# Patient Record
Sex: MALE | Race: WHITE | URBAN - METROPOLITAN AREA
[De-identification: names, ages, dates, MRNs, and addresses within clinical notes are randomized per-mention and may not be internally consistent; named-entity substitution may affect disease eponyms.]

---

## 2018-04-06 ENCOUNTER — TRANSFERRED RECORDS (OUTPATIENT)
Dept: HEALTH INFORMATION MANAGEMENT | Facility: CLINIC | Age: 58
End: 2018-04-06

## 2018-04-06 ENCOUNTER — MEDICAL CORRESPONDENCE (OUTPATIENT)
Dept: HEALTH INFORMATION MANAGEMENT | Facility: CLINIC | Age: 58
End: 2018-04-06

## 2018-04-16 DIAGNOSIS — Z79.899 HIGH RISK MEDICATION USE: Primary | ICD-10-CM

## 2018-04-17 ENCOUNTER — ALLIED HEALTH/NURSE VISIT (OUTPATIENT)
Dept: OPHTHALMOLOGY | Facility: CLINIC | Age: 58
End: 2018-04-17
Attending: OPHTHALMOLOGY
Payer: COMMERCIAL

## 2018-04-17 DIAGNOSIS — Z79.899 HIGH RISK MEDICATION USE: ICD-10-CM

## 2018-04-17 PROCEDURE — 92275 MFERG OU (BOTH EYES): CPT | Mod: ZF

## 2018-04-17 PROCEDURE — 40000269 ZZH STATISTIC NO CHARGE FACILITY FEE: Mod: ZF

## 2018-04-17 RX ORDER — HYDROXYCHLOROQUINE SULFATE 200 MG/1
TABLET, FILM COATED ORAL
COMMUNITY
Start: 2018-03-26

## 2018-04-17 RX ORDER — FOLIC ACID 1 MG/1
1 TABLET ORAL
COMMUNITY
Start: 2017-07-11

## 2018-04-17 RX ORDER — SIMVASTATIN 20 MG
20 TABLET ORAL
COMMUNITY
Start: 2017-04-10

## 2018-04-17 RX ORDER — LOSARTAN POTASSIUM AND HYDROCHLOROTHIAZIDE 12.5; 5 MG/1; MG/1
TABLET ORAL
COMMUNITY
Start: 2017-04-10

## 2018-04-17 RX ORDER — FLUOXETINE 10 MG/1
10 CAPSULE ORAL
COMMUNITY
Start: 2017-07-11

## 2018-04-17 ASSESSMENT — VISUAL ACUITY
OD_SC: 20/20
METHOD: SNELLEN - LINEAR
OS_SC: 20/20

## 2018-04-17 NOTE — MR AVS SNAPSHOT
After Visit Summary   2018    Ramiro Doe    MRN: [id number]           Patient Information     Date Of Birth          1960        Visit Information        Provider Department      2018 1:00 PM [facility]        Today's Diagnoses     High risk medication use           Follow-ups after your visit        Who to contact     Please call your clinic at [phone] to:    Ask questions about your health    Make or cancel appointments    Discuss your medicines    Learn about your test results    Speak to your doctor            Additional Information About Your Visit        MyChart Information     [employer] is an electronic gateway that provides easy, online access to your medical records. With [employer], you can request a clinic appointment, read your test results, renew a prescription or communicate with your care team.     To sign up for [employer] visit the website at [email or web address]   You will be asked to enter the access code listed below, as well as some personal information. Please follow the directions to create your username and password.     Your access code is: [email or web address]  Expires: 2018  4:38 PM     Your access code will  in 90 days. If you need help or a new code, please contact your [facility] or call [phone] for assistance.        Care EveryWhere ID     This is your Care EveryWhere ID. This could be used by other organizations to access your [facility] medical records  [id number]         Blood Pressure from Last 3 Encounters:   No data found for BP    Weight from Last 3 Encounters:   No data found for Wt              We Performed the Following     MFERG OU (both eyes)        Primary Care Provider Office Phone # Fax #    Agustín Manuel [phone]       [facility] [address]  [facility] [address]        Equal Access to Services     IOANA CORNELIUS AH: Hadii jt husain  bennett Lacey, priscilla fungadaha, qaeneidata kavic carson, lloyd williein hayaan lavalorie apolinarbrendan lalanabetsey flor. So [facility] [phone].    ATENCIÓN: Si habla debraañol, tiene a lopez disposición servicios gratuitos de asistencia lingüística. Brent al [phone].    We comply with applicable federal civil rights laws and [place] laws. We do not discriminate on the basis of race, color, national origin, age, disability, sex, sexual orientation, or gender identity.            Thank you!     Thank you for choosing [facility]  for your care. Our goal is always to provide you with excellent care. Hearing back from our patients is one way we can continue to improve our services. Please take a few minutes to complete the written survey that you may receive in the mail after your visit with us. Thank you!             Your Updated Medication List - Protect others around you: Learn how to safely use, store and throw away your medicines at www.disposemymeds.org.          This list is accurate as of 4/17/18  4:38 PM.  Always use your most recent med list.                   Brand Name Dispense Instructions for use Diagnosis    FLUoxetine 10 MG capsule    PROzac     Take 10 mg by mouth        folic acid 1 MG tablet    FOLVITE     Take 1 mg by mouth        hydroxychloroquine 200 MG tablet    PLAQUENIL     TAKE 1 TABLET BY MOUTH DAILY        losartan-hydrochlorothiazide 50-12.5 MG per tablet    HYZAAR          methotrexate (Anti-Rheumatic) 2.5 MG Tabs      Take 20 mg by mouth        simvastatin 20 MG tablet    ZOCOR     Take 20 mg by mouth

## 2018-04-17 NOTE — LETTER
2018         RE:  :  MRN: Ramiro Doe  1960  [id number]     Dear Dr. Muñoz,    Your patient, Ramiro Doe, came for a multifocal electroretinogram only.     Assessment & Plan     Ramiro Doe is a 57 year old male with the following diagnoses:   1. High risk medication use        This is a well-performed and reliable multifocal electroretinogram both eyes.  The amplitudes and latencies are normal throughout all 103 hexagons both eyes.  There is no evidence of plaquenil toxicity.    Again, thank you for allowing me to participate in the care of your patient.      Sincerely,    Krzysztof Fox MD  Professor, Neuro-Ophthalmology  Department of Ophthalmology and Visual Neurosciences  [facility]    CC: Agustín Manuel MD  [facility]  [address] [place] [address]  VIA Facsimile: [phone]     Nilo Muñoz, OD  [facility]  [address] [place] [address]  VIA Facsimile:  [phone]    DX = normal multifocal electroretinogram

## 2018-04-17 NOTE — NURSING NOTE
"Chief Complaints and History of Present Illnesses   Patient presents with     Procedure     mfERG for plaquenil monitoring     HPI    Affected eye(s):  Both   Symptoms:           Do you have eye pain now?:  No      Comments:  mfERG for plaquenil monitoring   Plaquenil started 12/2015 200mg bid, after several weeks decreased to  200mg/day for Rheumatoid Arthritis.  Height 6'1\", weight 180 lbs  Referred by Nilo Muñoz OD, no f/u scheduled.   Cori BLACK 3:32 PM April 17, 2018                  "

## 2018-04-17 NOTE — PROGRESS NOTES
Assessment & Plan     Ramiro Doe is a 57 year old male with the following diagnoses:   1. High risk medication use        This is a well-performed and reliable multifocal electroretinogram both eyes.  The amplitudes and latencies are normal throughout all 103 hexagons both eyes.  There is no evidence of plaquenil toxicity.           Attending Physician Attestation:  Complete documentation of historical and exam elements from today's encounter can be found in the full encounter summary report (not reduplicated in this progress note).  I personally obtained the chief complaint(s) and history of present illness.  I confirmed and edited as necessary the review of systems, past medical/surgical history, family history, social history, and examination findings as documented by others; and I examined the patient myself.  I personally reviewed the relevant tests, images, and reports as documented above.  I formulated and edited as necessary the assessment and plan and discussed the findings and management plan with the patient and family. - Krzysztof Fox MD